# Patient Record
(demographics unavailable — no encounter records)

---

## 2024-12-09 NOTE — HISTORY OF PRESENT ILLNESS
[Home] : at home, [unfilled] , at the time of the visit. [Medical Office: ([facility])___] : at the medical office located in  [Mother] : mother [FreeTextEntry1] : 15 yo male from [place] with hx of UC (12/8/2023) had his first colonoscopy at that time in the hospital, needed a transfusion.   He had symptoms in October. Was diagnosed in November. He had tried Remicade and high dose steroids. Also tried Rinvoq for 1 week. He couldnt tolerate oral medications. He had surgery 1/15/2024 - colectomy 12/18/2023-2/1/2024 - hospital admission. He had been on TPN. He had lost 35lbs.  He had surgery at [facility] with Dr. Massiel De La Garza - total colectomy. He has gained back 35lbs. He is doing well with his ileostomy, and emptying his bag 4-5x/day.  12/13/2024 Pt presents via video visit with his mother for pre-op discussion for j-pouch creation His mother notes that he has a hx of anemia. His most recent Hg was 7.5 on 11/06/24. He has had 4 iron infusions since.  Pt states that he currently experiences passage of blood per anus.  He had a clot after his last surgery near his PICC line (LUE) and was placed on blood thinners.  Denies perianal disease or small bowel disease.  Ostomy is functioning.   Pathology - colectomy, (See page: 121/231)

## 2024-12-09 NOTE — ASSESSMENT
[FreeTextEntry1] : PTT, INR,  -Discussed epidural and ureter stents I spent a good amount of time with the patient and the mother risk benefits morbidity complications including sexual dysfunction not to be able to do that failure rates bleeding blood clots were all explained she consents on the help of her son.  I did tell her I did tell her that he has a great future and we will try our best taking good care of him.  We will decide if this type of the incision and we will do this with a open procedure.  They understand.  Spent 20 minutes time face-to-face and we have received the operative note and with these findings I am still going to proceed with a ureteric stent insertion since the colon they said that later they left behind is not as long as the mother told me that could be.

## 2024-12-09 NOTE — PHYSICAL EXAM
[No Rash or Lesion] : No rash or lesion [Alert] : alert [Oriented to Person] : oriented to person [Oriented to Place] : oriented to place [Oriented to Time] : oriented to time [de-identified] : WDWMARCELA [de-identified] : NC/AT, Anicteric [de-identified] : no C/C/E

## 2024-12-09 NOTE — PHYSICAL EXAM
[No Rash or Lesion] : No rash or lesion [Alert] : alert [Oriented to Person] : oriented to person [Oriented to Place] : oriented to place [Oriented to Time] : oriented to time [de-identified] : WDWMARCELA [de-identified] : NC/AT, Anicteric [de-identified] : no C/C/E

## 2024-12-09 NOTE — HISTORY OF PRESENT ILLNESS
[Home] : at home, [unfilled] , at the time of the visit. [Medical Office: ([facility])___] : at the medical office located in  [Mother] : mother [FreeTextEntry1] : 15 yo male from [place] with hx of UC (12/8/2023) had his first colonoscopy at that time in the hospital, needed a transfusion.   He had symptoms in October. Was diagnosed in November. He had tried Remicade and high dose steroids. Also tried Rinvoq for 1 week. He couldnt tolerate oral medications. He had surgery 1/15/2024 - colectomy 12/18/2023-2/1/2024 - hospital admission. He had been on TPN. He had lost 35lbs.  He had surgery at [facility] with Dr. Massiel De La Garza - total colectomy. He has gained back 35lbs. He is doing well with his ileostomy, and emptying his bag 4-5x/day.  12/13/2024 Pt presents via video visit with his mother for pre-op discussion for j-pouch creation His mother notes that he has a hx of anemia. His most recent Hg was 7.5 on 11/06/24. He has had 4 iron infusions since.  Pt states that he currently experiences passage of blood per anus.  He had a clot after his last surgery near his PICC line (LUE) and was placed on blood thinners.  Denies perianal disease or small bowel disease.  Ostomy is functioning.   Pathology - colectomy, (See page: 121/231)

## 2025-01-24 NOTE — PHYSICAL EXAM
[Alert] : alert [Oriented to Person] : oriented to person [Oriented to Place] : oriented to place [Oriented to Time] : oriented to time [Calm] : calm [de-identified] : +stoma [de-identified] : WDWMARCELA [de-identified] : ANICTERIC

## 2025-01-24 NOTE — HISTORY OF PRESENT ILLNESS
[Home] : at home, [unfilled] , at the time of the visit. [Medical Office: ([facility])___] : at the medical office located in  [FreeTextEntry1] : 17 yo male from [place] with hx of UC (12/8/2023) had his first colonoscopy at that time in the hospital, needed a transfusion. He had symptoms in October. Was diagnosed in November. He had tried Remicade and high dose steroids. Also tried Rinvoq for 1 week. He couldnt tolerate oral medications. He had surgery 1/15/2024 - colectomy 12/18/2023-2/1/2024 - hospital admission. He had been on TPN. He had lost 35lbs. He had surgery at [facility] with Dr. Massiel De La Garza - total colectomy. He has gained back 35lbs. He is doing well with his ileostomy, and emptying his bag 4-5x/day.  12/13/2024 Pt presents via video visit with his mother for pre-op discussion for j-pouch creation His mother notes that he has a hx of anemia. His most recent Hg was 7.5 on 11/06/24. He has had 4 iron infusions since. Pt states that he currently experiences passage of blood per anus. He had a clot after his last surgery near his PICC line (LUE) and was placed on blood thinners. Denies perianal disease or small bowel disease. Ostomy is functioning.  Pathology - colectomy, (See page: 121/231)  01/16/25 Here today via video with his mother for f/u appt s/p j-pouch creation 12/13/24 Sleeping ok Ostomy is functioning. He states that is stoma is "almost never liquidlike" and has been taking loperamide (2-3 tablets per day)   Eating and drinking

## 2025-01-24 NOTE — HISTORY OF PRESENT ILLNESS
[Home] : at home, [unfilled] , at the time of the visit. [Medical Office: ([facility])___] : at the medical office located in  [FreeTextEntry1] : 15 yo male from [place] with hx of UC (12/8/2023) had his first colonoscopy at that time in the hospital, needed a transfusion. He had symptoms in October. Was diagnosed in November. He had tried Remicade and high dose steroids. Also tried Rinvoq for 1 week. He couldnt tolerate oral medications. He had surgery 1/15/2024 - colectomy 12/18/2023-2/1/2024 - hospital admission. He had been on TPN. He had lost 35lbs. He had surgery at [facility] with Dr. Massiel De La Garza - total colectomy. He has gained back 35lbs. He is doing well with his ileostomy, and emptying his bag 4-5x/day.  12/13/2024 Pt presents via video visit with his mother for pre-op discussion for j-pouch creation His mother notes that he has a hx of anemia. His most recent Hg was 7.5 on 11/06/24. He has had 4 iron infusions since. Pt states that he currently experiences passage of blood per anus. He had a clot after his last surgery near his PICC line (LUE) and was placed on blood thinners. Denies perianal disease or small bowel disease. Ostomy is functioning.  Pathology - colectomy, (See page: 121/231)  01/16/25 Here today via video with his mother for f/u appt s/p j-pouch creation 12/13/24 Sleeping ok Ostomy is functioning. He states that is stoma is "almost never liquidlike" and has been taking loperamide (2-3 tablets per day)   Eating and drinking

## 2025-01-24 NOTE — ASSESSMENT
[FreeTextEntry1] : PTT, INR, -Discussed epidural and ureter stents I spent a good amount of time with the patient and the mother risk benefits morbidity complications including sexual dysfunction not to be able to do that failure rates bleeding blood clots were all explained she consents on the help of her son. I did tell her I did tell her that he has a great future and we will try our best taking good care of him. We will decide if this type of the incision and we will do this with a open procedure. They understand. Spent 20 minutes time face-to-face and we have received the operative note and with these findings I am still going to proceed with a ureteric stent insertion since the colon they said that later they left behind is not as long as the mother told me that could be.  01/16/25

## 2025-03-06 NOTE — PHYSICAL EXAM
[TWNoteComboBox1] : LILA [TWNoteComboBox4] : Round [TWNoteComboBox6] : Pink [TWNoteComboBox5] : Denuded [TWNoteComboBox2] : Ileostomy [TWNoteComboBox3] : 2-piece system

## 2025-03-06 NOTE — PROCEDURE
[FreeTextEntry1] : Patient was seen in the office today for ostomy care. Patient has been having issues with leakage, reports having to change pouch at least 4xs/day. Complete pouching system changed. Ileostomy located in RLQ, measures 1" pink & viable. Peristomal skin is denuded with erythema. Patient is currently using a flat 1 - piece drainable pouching system from [employer]. Dusting of stoma powder applied to absorb moisture & dabbed w/ cavilon 3M to seal & protect skin. Mucocutaneous junction intact. A brava skin protective barrier sheet was applied directly to patient's skin. Repouched w/ 1 3/4" [place] soft convex skin barrier, bead of stoma paste applied to the back of skin barrier to caulk & drainable pouch was attached to wafer. Pt observed as reviewed steps for change. Participated w/ snapping pouch to skin barrier & closing pouch end. Ostomy belt was applied for additional security. Patient was given samples of wafers, pouches and skin protective barrier sheets in office. Samples also requested from [employer] - 46677 wafer, #35313 pouch, #7300 medium belt, #8815 ostomy rings. Samples also requested from [employer]- 1- piece deep convex #44455, Deep convex wafer #46575, Drainable pouch #00327, Slim ostomy rings #36047, 40 inch belt #4237, 4x4 brava protective barrier sheets #05505, skin remover wipes #60191. Communication with patient's mother regarding ostomy care. Will continue to follow up with patient

## 2025-04-25 NOTE — HISTORY OF PRESENT ILLNESS
[FreeTextEntry1] : 17 yo male from [place] with hx of UC (12/8/2023) had his first colonoscopy at that time in the hospital, needed a transfusion. He had symptoms in October. Was diagnosed in November. He had tried Remicade and high dose steroids. Also tried Rinvoq for 1 week. He couldnt tolerate oral medications. He had surgery 1/15/2024 - colectomy 12/18/2023-2/1/2024 - hospital admission. He had been on TPN. He had lost 35lbs. He had surgery at [facility] with Dr. Massiel De La Garza - total colectomy. He has gained back 35lbs. He is doing well with his ileostomy, and emptying his bag 4-5x/day.  12/13/2024 Pt presents via video visit with his mother for pre-op discussion for j-pouch creation His mother notes that he has a hx of anemia. His most recent Hg was 7.5 on 11/06/24. He has had 4 iron infusions since. Pt states that he currently experiences passage of blood per anus. He had a clot after his last surgery near his PICC line (LUE) and was placed on blood thinners. Denies perianal disease or small bowel disease. Ostomy is functioning.  Pathology - colectomy, (See page: 121/231)  01/16/25 Here today via video with his mother for f/u appt s/p j-pouch creation 12/13/24 Sleeping ok Ostomy is functioning. He states that is stoma is "almost never liquidlike" and has been taking loperamide (2-3 tablets per day) Eating and drinking.  04/24/25 18 y/o M with hx of J-pouch who is here today with his mother for pre-op for ileostomy closure and ready to proceed with surgery He is ready to proceed with surgery Ostomy is functioning. No complaints Denies fever, cough, chest pain, SOB, nausea, vomiting, leg pain or leg swelling.  He is currently a hemal in high school. He is looking forward to summer break and senior year.

## 2025-04-25 NOTE — HISTORY OF PRESENT ILLNESS
[FreeTextEntry1] : 15 yo male from [place] with hx of UC (12/8/2023) had his first colonoscopy at that time in the hospital, needed a transfusion. He had symptoms in October. Was diagnosed in November. He had tried Remicade and high dose steroids. Also tried Rinvoq for 1 week. He couldnt tolerate oral medications. He had surgery 1/15/2024 - colectomy 12/18/2023-2/1/2024 - hospital admission. He had been on TPN. He had lost 35lbs. He had surgery at [facility] with Dr. Massiel De La Garza - total colectomy. He has gained back 35lbs. He is doing well with his ileostomy, and emptying his bag 4-5x/day.  12/13/2024 Pt presents via video visit with his mother for pre-op discussion for j-pouch creation His mother notes that he has a hx of anemia. His most recent Hg was 7.5 on 11/06/24. He has had 4 iron infusions since. Pt states that he currently experiences passage of blood per anus. He had a clot after his last surgery near his PICC line (LUE) and was placed on blood thinners. Denies perianal disease or small bowel disease. Ostomy is functioning.  Pathology - colectomy, (See page: 121/231)  01/16/25 Here today via video with his mother for f/u appt s/p j-pouch creation 12/13/24 Sleeping ok Ostomy is functioning. He states that is stoma is "almost never liquidlike" and has been taking loperamide (2-3 tablets per day) Eating and drinking.  04/24/25 16 y/o M with hx of J-pouch who is here today with his mother for pre-op for ileostomy closure and ready to proceed with surgery He is ready to proceed with surgery Ostomy is functioning. No complaints Denies fever, cough, chest pain, SOB, nausea, vomiting, leg pain or leg swelling.  He is currently a hemal in high school. He is looking forward to summer break and senior year.

## 2025-04-25 NOTE — PHYSICAL EXAM
[Alert] : alert [Oriented to Person] : oriented to person [Oriented to Place] : oriented to place [Oriented to Time] : oriented to time [Calm] : calm [de-identified] : +stoma [de-identified] : WDWMARCELA [de-identified] : ANICTERIC

## 2025-04-25 NOTE — ASSESSMENT
[FreeTextEntry1] : 16 y/o M with hx of J-pouch who is here today with his mother for pre-op for ileostomy closure and ready to proceed with surgery -ileostomy closure on 05/02/25 at [facility] -dinner at 4PM on day before -discussed low fiber diet x 4-6 weeks -discussed lifting restrictions x 4-6 weeks -Risks, benefits, and alternatives discussed with patient. Patient consents to procedure.  CB

## 2025-04-25 NOTE — ASSESSMENT
[FreeTextEntry1] : 16 y/o M with hx of J-pouch who is here today with his mother for pre-op for ileostomy closure and ready to proceed with surgery -ileostomy closure on 05/02/25 at [facility] -dinner at 4PM on day before -discussed low fiber diet x 4-6 weeks -discussed lifting restrictions x 4-6 weeks -Risks, benefits, and alternatives discussed with patient. Patient consents to procedure.  CB

## 2025-04-25 NOTE — PHYSICAL EXAM
[Alert] : alert [Oriented to Person] : oriented to person [Oriented to Place] : oriented to place [Oriented to Time] : oriented to time [Calm] : calm [de-identified] : +stoma [de-identified] : WDWMARCELA [de-identified] : ANICTERIC

## 2025-05-15 NOTE — HISTORY OF PRESENT ILLNESS
[FreeTextEntry1] : 16 yo male from [place] s/p ileostomy closure on 5/2/2025. He is going to the bathroom 4x day. He has some butt burn depending what he eats. He is sleeping ok at night. Appetite is returning to normal. No cocerns at this time.

## 2025-05-15 NOTE — PHYSICAL EXAM
[No Rash or Lesion] : No rash or lesion [Alert] : alert [Oriented to Person] : oriented to person [Oriented to Place] : oriented to place [Calm] : calm [de-identified] : c/d/i [de-identified] : wdwn male, nad [de-identified] : nc/at [facility] [de-identified] : no c/c/e

## 2025-05-15 NOTE — ASSESSMENT
[FreeTextEntry1] : 18 yo male s/p 3 staged jpouch doing well slowly liberalize diet and activity follow up in 1 year for a pouchoscopy discussed skin cancer prevention of wounds discussed hernia prevention and support belts

## 2025-05-15 NOTE — REASON FOR VISIT
[Follow-Up: _____] : a [unfilled] follow-up visit [Mother] : mother [Home] : at home, [unfilled] , at the time of the visit. [Medical Office: ([facility])___] : at the medical office located in  [Telehealth (audio & video)] : This visit was provided via telehealth using real-time 2-way audio visual technology. [Verbal consent obtained from patient] : the patient, [unfilled] [Post Op: _________] : a [unfilled] post op visit [Parent] : parent

## 2025-05-15 NOTE — HISTORY OF PRESENT ILLNESS
[FreeTextEntry1] : 18 yo male from [place] s/p ileostomy closure on 5/2/2025. He is going to the bathroom 4x day. He has some butt burn depending what he eats. He is sleeping ok at night. Appetite is returning to normal. No cocerns at this time.

## 2025-05-15 NOTE — ASSESSMENT
[FreeTextEntry1] : 16 yo male s/p 3 staged jpouch doing well slowly liberalize diet and activity follow up in 1 year for a pouchoscopy discussed skin cancer prevention of wounds discussed hernia prevention and support belts

## 2025-05-15 NOTE — PHYSICAL EXAM
[No Rash or Lesion] : No rash or lesion [Alert] : alert [Oriented to Person] : oriented to person [Oriented to Place] : oriented to place [Calm] : calm [de-identified] : c/d/i [de-identified] : wdwn male, nad [de-identified] : nc/at [facility] [de-identified] : no c/c/e

## 2025-05-15 NOTE — REASON FOR VISIT
[Follow-Up: _____] : a [unfilled] follow-up visit [Mother] : mother [Home] : at home, [unfilled] , at the time of the visit. [Medical Office: ([facility])___] : at the medical office located in  [Telehealth (audio & video)] : This visit was provided via telehealth using real-time 2-way audio visual technology. [Verbal consent obtained from patient] : the patient, [unfilled] [Post Op: _________] : a [unfilled] post op visit [Parent] : parent